# Patient Record
Sex: FEMALE | Race: WHITE | URBAN - METROPOLITAN AREA
[De-identification: names, ages, dates, MRNs, and addresses within clinical notes are randomized per-mention and may not be internally consistent; named-entity substitution may affect disease eponyms.]

---

## 2021-09-09 ENCOUNTER — OFFICE VISIT (OUTPATIENT)
Dept: URBAN - METROPOLITAN AREA CLINIC 51 | Facility: CLINIC | Age: 61
End: 2021-09-09
Payer: COMMERCIAL

## 2021-09-09 DIAGNOSIS — H40.1134 PRIMARY OPEN-ANGLE GLAUCOMA, INDETERMINATE, BILATERAL: Primary | ICD-10-CM

## 2021-09-09 DIAGNOSIS — H43.313 VITREOUS MEMBRANES AND STRANDS, BILATERAL: ICD-10-CM

## 2021-09-09 PROCEDURE — 99204 OFFICE O/P NEW MOD 45 MIN: CPT | Performed by: OPTOMETRIST

## 2021-09-09 PROCEDURE — 92133 CPTRZD OPH DX IMG PST SGM ON: CPT | Performed by: OPTOMETRIST

## 2021-09-09 PROCEDURE — 92134 CPTRZ OPH DX IMG PST SGM RTA: CPT | Performed by: OPTOMETRIST

## 2021-09-09 ASSESSMENT — KERATOMETRY
OD: 45.92
OS: 45.52

## 2021-09-09 ASSESSMENT — INTRAOCULAR PRESSURE
OD: 18
OS: 16

## 2021-09-09 ASSESSMENT — VISUAL ACUITY
OS: 20/20
OD: 20/25

## 2021-09-09 NOTE — IMPRESSION/PLAN
Impression: Vitreous membranes and strands, bilateral: H43.313. Plan: Retina is attached 360 degrees with no signs of any retinal holes, tears, or detachments observed on today's dilated examination. Pt to RTC ASAP if increase in floaters, flashes, or curtain or veil taking away vision.

## 2021-09-09 NOTE — IMPRESSION/PLAN
Impression: Tear film insufficiency of bilateral lacrimal glands: H04.123. *MGD OU Plan: Recommend continue OTC AT's at least 2-3 times a day or more OU. Can use more frequently if symptomatic. Secondary Intention Text (Leave Blank If You Do Not Want): The defect will heal with secondary intention.

## 2021-09-09 NOTE — IMPRESSION/PLAN
Impression: Combined forms of age-related cataract, bilateral: H25.813. Plan: Discussed cataracts with patient. Discussed treatment options. Surgical treatment is recommended. Surgical risks and benefits discussed. Patient elects surgical treatment. Recommend surgery OU, OD first. 
Patient is candidate/interested in multifocal, toric, standard, LenSx and ORA. Aim OD: plano  Aim OS: plano Patient understands that she will need glasses for reading and all near work following surgery and may need full time glasses for best possible vision after cataract surgery. Will bring back prior to cataract surgery for additional glaucoma testing (24-2 HVF, pachymetry, IOP check) with Dr. Donte Cowart.

## 2021-09-09 NOTE — IMPRESSION/PLAN
Impression: Primary open-angle glaucoma, indeterminate, bilateral: H40.2939. *Denies family hx Plan: Due to asymmetric [place] 76 appearance OS>OD
IOPs today: 18/16 RNFL OCT (09/09/2021) OD: IT abn, ST bdl ; OS: ST/IT abn Reviewed today's findings with patient. RNFL OCT taken today that suggests thinning OU. Will bring back prior to cataract surgery for 24-2 HVF + pachymetry + IOP check to determine glaucoma state.

## 2021-10-11 ENCOUNTER — OFFICE VISIT (OUTPATIENT)
Dept: URBAN - METROPOLITAN AREA CLINIC 44 | Facility: CLINIC | Age: 61
End: 2021-10-11
Payer: COMMERCIAL

## 2021-10-11 DIAGNOSIS — Z01.818 ENCOUNTER FOR OTHER PREPROCEDURAL EXAMINATION: Primary | ICD-10-CM

## 2021-10-11 DIAGNOSIS — H25.813 COMBINED FORMS OF AGE-RELATED CATARACT, BILATERAL: ICD-10-CM

## 2021-10-11 PROCEDURE — 99203 OFFICE O/P NEW LOW 30 MIN: CPT | Performed by: PHYSICIAN ASSISTANT

## 2021-10-11 PROCEDURE — 92025 CPTRIZED CORNEAL TOPOGRAPHY: CPT | Performed by: OPHTHALMOLOGY

## 2021-10-11 ASSESSMENT — PACHYMETRY
OS: 22.77
OD: 22.62
OS: 2.91
OD: 3.03

## 2021-10-13 ENCOUNTER — OFFICE VISIT (OUTPATIENT)
Dept: URBAN - METROPOLITAN AREA CLINIC 51 | Facility: CLINIC | Age: 61
End: 2021-10-13
Payer: COMMERCIAL

## 2021-10-13 DIAGNOSIS — H40.1132 PRIMARY OPEN-ANGLE GLAUCOMA, MODERATE STAGE, BILATERAL: ICD-10-CM

## 2021-10-13 DIAGNOSIS — H04.123 TEAR FILM INSUFFICIENCY OF BILATERAL LACRIMAL GLANDS: Primary | ICD-10-CM

## 2021-10-13 PROCEDURE — 99214 OFFICE O/P EST MOD 30 MIN: CPT | Performed by: OPTOMETRIST

## 2021-10-13 PROCEDURE — 92083 EXTENDED VISUAL FIELD XM: CPT | Performed by: OPTOMETRIST

## 2021-10-13 PROCEDURE — 76514 ECHO EXAM OF EYE THICKNESS: CPT | Performed by: OPTOMETRIST

## 2021-10-13 RX ORDER — LATANOPROST 50 UG/ML
0.005 % SOLUTION OPHTHALMIC
Qty: 2.5 | Refills: 0 | Status: INACTIVE
Start: 2021-10-13 | End: 2021-11-04

## 2021-10-13 ASSESSMENT — INTRAOCULAR PRESSURE
OD: 30
OS: 24

## 2021-10-13 NOTE — IMPRESSION/PLAN
Impression: Primary open-angle glaucoma, moderate stage, bilateral: H40.1132. *Denies family hx *RNFL OCT (09/09/2021) OD: IT abn, ST bdl ; OS: ST/IT abn Plan: IOPs today: 30/24 without medications Pachymetry: 474/477 (thin OU) HVF 24-2 (10/13/2021) OD: sup arcuate ;  OS:sup/nasal step Reviewed todays findings. IOPs elevated today and HVF shows visual field loss due to glaucoma. Discussed the need to start medication to lower IOPs and to prevent further vision loss due to glaucoma. START Latanoprost QHS OU (ERx'd to pharmacy) RTC as scheduled with Dr. Gavin Brewer [facility]

## 2021-10-13 NOTE — IMPRESSION/PLAN
Impression: Tear film insufficiency of bilateral lacrimal glands: H04.123. Plan: Recommend start using OTC AT's at least 2-3 times per day or more OU. Can use more often if symptomatic.

## 2021-10-15 ENCOUNTER — OFFICE VISIT (OUTPATIENT)
Dept: URBAN - METROPOLITAN AREA CLINIC 51 | Facility: CLINIC | Age: 61
End: 2021-10-15
Payer: COMMERCIAL

## 2021-10-15 DIAGNOSIS — H40.1121 PRIMARY OPEN-ANGLE GLAUCOMA, LEFT EYE, MILD STAGE: ICD-10-CM

## 2021-10-15 DIAGNOSIS — H40.1113 PRIMARY OPEN-ANGLE GLAUCOMA, RIGHT EYE, SEVERE STAGE: ICD-10-CM

## 2021-10-15 PROCEDURE — 92020 GONIOSCOPY: CPT | Performed by: OPHTHALMOLOGY

## 2021-10-15 PROCEDURE — 76514 ECHO EXAM OF EYE THICKNESS: CPT | Performed by: OPHTHALMOLOGY

## 2021-10-15 PROCEDURE — 92133 CPTRZD OPH DX IMG PST SGM ON: CPT | Performed by: OPHTHALMOLOGY

## 2021-10-15 PROCEDURE — 99204 OFFICE O/P NEW MOD 45 MIN: CPT | Performed by: OPHTHALMOLOGY

## 2021-10-15 RX ORDER — KETOROLAC TROMETHAMINE 5 MG/ML
0.5 % SOLUTION OPHTHALMIC
Qty: 10 | Refills: 1 | Status: INACTIVE
Start: 2021-10-15 | End: 2021-11-16

## 2021-10-15 RX ORDER — OFLOXACIN 3 MG/ML
0.3 % SOLUTION/ DROPS OPHTHALMIC
Qty: 5 | Refills: 1 | Status: INACTIVE
Start: 2021-10-15 | End: 2021-11-16

## 2021-10-15 RX ORDER — PREDNISOLONE ACETATE 10 MG/ML
1 % SUSPENSION/ DROPS OPHTHALMIC
Qty: 10 | Refills: 1 | Status: INACTIVE
Start: 2021-10-15 | End: 2021-11-09

## 2021-10-15 ASSESSMENT — VISUAL ACUITY
OD: 20/25
OS: 20/20

## 2021-10-15 ASSESSMENT — INTRAOCULAR PRESSURE
OS: 17
OD: 17

## 2021-10-15 ASSESSMENT — PACHYMETRY
OD: 3.03
OS: 2.91
OS: 22.77
OD: 22.62

## 2021-10-15 NOTE — IMPRESSION/PLAN
Impression: Combined forms of age-related cataract, bilateral: H25.813. Plan: Discussed cataract diagnosis in detail with patient. Advised patient of treatment options as well as lens options. Discussed risks, benefits, and expectations of cataract surgery. Patient also understands glasses may be necessary for sharpest vision after surgery. Patient understands and accepts that glasses will be necessary following cataract surgery for near and possibly distance. New glasses Rx was not given today. Cataract surgery was offered in both eye(s). Patient wishes to proceed with Cataract surgery. Patient to see [facility] to schedule CE/Phaco/IOL/KDB OD 1st eye [[Aim: -0.25 OU. Std lens, No LRI; patient declines ORA. No Trimoxi/Dexycu, Use Oflox/Pred/Ket QID OD]] Okay to proceed with the second eye once the first eye is cleared.

## 2021-10-15 NOTE — IMPRESSION/PLAN
Impression: Primary open-angle glaucoma, right eye, severe stage: H40.1113. Plan: Discussed cataract and glaucoma diagnosis in detail with patient. Phaco/IOL, [place] Dual Blade Goniotomy surgery was discussed in detail. Discussed risks, benefits, and expectations of cataract surgery. Patient also understands glasses will be necessary for sharpest vision after surgery. Although surgery is expected to improve the condition, the patient understands that there is a chance that the condition could worsen in the future. Patient advised that ongoing uncontrolled glaucoma may result in permanent vision loss. Discussed surgery in detail with patient. Post-Operative instructions reviewed with patient. Discussed activity restrictions including no bending head below waist, rubbing the eye, straining or lifting over 10 lbs, stay out of rufus, dirty areas and shield at night for one week. All questions answered.

## 2021-10-25 ENCOUNTER — SURGERY (OUTPATIENT)
Dept: URBAN - METROPOLITAN AREA SURGERY 19 | Facility: SURGERY | Age: 61
End: 2021-10-25
Payer: COMMERCIAL

## 2021-10-26 ENCOUNTER — POST-OPERATIVE VISIT (OUTPATIENT)
Dept: URBAN - METROPOLITAN AREA CLINIC 51 | Facility: CLINIC | Age: 61
End: 2021-10-26
Payer: COMMERCIAL

## 2021-10-26 PROCEDURE — 99024 POSTOP FOLLOW-UP VISIT: CPT | Performed by: OPTOMETRIST

## 2021-10-26 ASSESSMENT — INTRAOCULAR PRESSURE: OD: 23

## 2021-10-26 NOTE — IMPRESSION/PLAN
Impression: S/P Cataract Extraction by phacoemulsification with IOL placement; Goniotomy OD - 1 Day. Encounter for surgical aftercare following surgery on a sense organ  Z48.810. Plan: POD1: Patient doing well. Minimal swelling noted. Vision is doing excellent 1 day PO. Recommend ATs as needed OU. Reviewed post op restrictions apply for 1 week. Drop instructions: Pred 4-3-2-1 taper (each week decrease). Ofloxacin qid x 1 week. Diclofenac qid x 4 weeks. RTC as scheduled for 1 week post op appointment.

## 2021-11-02 ENCOUNTER — POST-OPERATIVE VISIT (OUTPATIENT)
Dept: URBAN - METROPOLITAN AREA CLINIC 44 | Facility: CLINIC | Age: 61
End: 2021-11-02
Payer: COMMERCIAL

## 2021-11-02 DIAGNOSIS — Z48.810 ENCOUNTER FOR SURGICAL AFTERCARE FOLLOWING SURGERY ON A SENSE ORGAN: Primary | ICD-10-CM

## 2021-11-02 PROCEDURE — 99024 POSTOP FOLLOW-UP VISIT: CPT | Performed by: OPHTHALMOLOGY

## 2021-11-02 ASSESSMENT — INTRAOCULAR PRESSURE
OS: 17
OD: 20

## 2021-11-02 ASSESSMENT — VISUAL ACUITY: OD: 20/20

## 2021-11-02 NOTE — IMPRESSION/PLAN
Impression:  Encounter for surgical aftercare following surgery on a sense organ  Z48.810. Plan: POW1 Phaco/IOL OD. Discussed diagnosis and treatment options in detail. Patient may proceed with second eye cataract surgery based on complaint today. Surgical risks and complications were reviewed today and all questions were answered. Patient reminded of need for glasses for best vision after cataract surgery. Recommend patient continue not rubbing surgical eye for 1 month, no swimming or eye makeup for a total of two weeks. Patient wishes to proceed with surgery of the second eye.
-Drop schedule: Stop Ofloxacin. Continue Ketorolac QID OD [id number] from surgery. Begin taper of Prednisolone OD to TID x1week then BID x1week then QD x1week then stop. Okay to proceed with the second eye- first eye is cleared.

## 2021-11-08 ENCOUNTER — SURGERY (OUTPATIENT)
Dept: URBAN - METROPOLITAN AREA SURGERY 19 | Facility: SURGERY | Age: 61
End: 2021-11-08
Payer: COMMERCIAL

## 2021-11-08 DIAGNOSIS — H25.812 COMBINED FORMS OF AGE-RELATED CATARACT, LEFT EYE: Primary | ICD-10-CM

## 2021-11-09 ENCOUNTER — POST-OPERATIVE VISIT (OUTPATIENT)
Dept: URBAN - METROPOLITAN AREA CLINIC 51 | Facility: CLINIC | Age: 61
End: 2021-11-09
Payer: COMMERCIAL

## 2021-11-09 PROCEDURE — 99024 POSTOP FOLLOW-UP VISIT: CPT | Performed by: OPTOMETRIST

## 2021-11-09 RX ORDER — PREDNISOLONE ACETATE 10 MG/ML
1 % SUSPENSION/ DROPS OPHTHALMIC
Qty: 10 | Refills: 1 | Status: INACTIVE
Start: 2021-11-09 | End: 2021-11-16

## 2021-11-09 ASSESSMENT — INTRAOCULAR PRESSURE: OS: 13

## 2021-11-09 NOTE — IMPRESSION/PLAN
Impression: S/P Cataract Extraction by phacoemulsification with IOL placement; Goniotomy OS - 1 Day. Presence of intraocular lens  Z96.1. Plan: POD1: patient doing well. Mild swelling/inflammation noted. ATs as needed OU. Pred 4-3-2-1 taper (each week decrease). Ofloxacin qid x 1 week. Diclofenac qid x 4 weeks. Continue Latanoprost QHS OU. RTC as scheduled.

## 2021-11-16 ENCOUNTER — POST-OPERATIVE VISIT (OUTPATIENT)
Dept: URBAN - METROPOLITAN AREA CLINIC 44 | Facility: CLINIC | Age: 61
End: 2021-11-16
Payer: COMMERCIAL

## 2021-11-16 PROCEDURE — 99024 POSTOP FOLLOW-UP VISIT: CPT | Performed by: OPHTHALMOLOGY

## 2021-11-16 RX ORDER — PREDNISOLONE ACETATE 10 MG/ML
1 % SUSPENSION/ DROPS OPHTHALMIC
Qty: 10 | Refills: 1 | Status: INACTIVE
Start: 2021-11-16 | End: 2021-12-14

## 2021-11-16 RX ORDER — KETOROLAC TROMETHAMINE 5 MG/ML
0.5 % SOLUTION OPHTHALMIC
Qty: 10 | Refills: 1 | Status: INACTIVE
Start: 2021-11-16 | End: 2021-12-14

## 2021-11-16 ASSESSMENT — INTRAOCULAR PRESSURE
OD: 20
OS: 20

## 2021-11-16 NOTE — IMPRESSION/PLAN
Impression: S/P Cataract Extraction by phacoemulsification with IOL placement; Goniotomy OS - 8 Days. Presence of intraocular lens  Z96.1. Plan: Advised patient that eye is healing well. Discontinue activity restrictions and shield use. Patient to continue not rubbing surgical eye for 1 month. Drop instructions: Stop Ofloxacin. Continue Ketorolac QID OS [id number] from surgery. Begin taper of Prednisolone OS to TID x1week then BID x1week then QD x1week then stop, CONTINUE Latanoprost QHS OU.   
RTC 3 weeks for Refraction/Dilation with Dr. Flor Cos - then April for VF 24-2 JORDEN STD and IOP check - patient to follow in general clinic
*Dr. Lloyd Bradford if laser/surgery is needed*

## 2021-12-14 ENCOUNTER — POST-OPERATIVE VISIT (OUTPATIENT)
Dept: URBAN - METROPOLITAN AREA CLINIC 51 | Facility: CLINIC | Age: 61
End: 2021-12-14
Payer: COMMERCIAL

## 2021-12-14 DIAGNOSIS — H52.4 PRESBYOPIA: Primary | ICD-10-CM

## 2021-12-14 DIAGNOSIS — Z96.1 PRESENCE OF INTRAOCULAR LENS: ICD-10-CM

## 2021-12-14 PROCEDURE — 99024 POSTOP FOLLOW-UP VISIT: CPT | Performed by: OPTOMETRIST

## 2021-12-14 RX ORDER — LATANOPROST 50 UG/ML
0.005 % SOLUTION OPHTHALMIC
Qty: 7.5 | Refills: 2 | Status: ACTIVE
Start: 2021-12-14

## 2021-12-14 ASSESSMENT — INTRAOCULAR PRESSURE
OS: 20
OD: 19

## 2021-12-14 ASSESSMENT — VISUAL ACUITY
OD: 20/20
OS: 20/20

## 2021-12-14 NOTE — IMPRESSION/PLAN
Impression: S/P Cataract Extraction by phacoemulsification with IOL placement; Goniotomy OS - 36 Days. Presence of intraocular lens  Z96.1. Plan: Doing well s/p surgery. Released SRx. Off Latanoprost for about a month (last appt). Refilled drops.   F/u ~ 4 mos CE, RNFL/GCC, HVF

## 2022-05-05 ENCOUNTER — OFFICE VISIT (OUTPATIENT)
Dept: URBAN - METROPOLITAN AREA CLINIC 51 | Facility: CLINIC | Age: 62
End: 2022-05-05
Payer: COMMERCIAL

## 2022-05-05 DIAGNOSIS — Z96.1 PRESENCE OF INTRAOCULAR LENS: ICD-10-CM

## 2022-05-05 DIAGNOSIS — H40.1132 PRIMARY OPEN-ANGLE GLAUCOMA, MODERATE STAGE, BILATERAL: Primary | ICD-10-CM

## 2022-05-05 DIAGNOSIS — H52.4 PRESBYOPIA: ICD-10-CM

## 2022-05-05 DIAGNOSIS — H43.313 VITREOUS MEMBRANES AND STRANDS, BILATERAL: ICD-10-CM

## 2022-05-05 PROCEDURE — 92083 EXTENDED VISUAL FIELD XM: CPT | Performed by: OPTOMETRIST

## 2022-05-05 PROCEDURE — 92133 CPTRZD OPH DX IMG PST SGM ON: CPT | Performed by: OPTOMETRIST

## 2022-05-05 PROCEDURE — 92014 COMPRE OPH EXAM EST PT 1/>: CPT | Performed by: OPTOMETRIST

## 2022-05-05 ASSESSMENT — INTRAOCULAR PRESSURE
OS: 16
OD: 16

## 2022-05-05 ASSESSMENT — VISUAL ACUITY
OD: 20/20
OS: 20/20

## 2022-05-05 ASSESSMENT — KERATOMETRY
OS: 45.58
OD: 46.02

## 2022-05-05 NOTE — IMPRESSION/PLAN
Impression: Presence of intraocular lens: Z96.1. Plan: s/p PCIOL/KDB. Clear/well positioned. Monitor.

## 2022-05-05 NOTE — IMPRESSION/PLAN
Impression: Vitreous membranes and strands, bilateral: H43.313. Plan: New PVD noted OS. Educated patient on process of vitreous syneresis. Retina is attached 360 degrees with no signs of any retinal holes, tears, or detachments observed on today's dilated examination. Patient to RTC ASAP if increase in floaters, flashes, or curtain or veil taking away vision.

## 2022-05-05 NOTE — IMPRESSION/PLAN
Impression: Primary open-angle glaucoma, moderate stage, bilateral: H40.1132. Plan: IOPs today: 16/16 on Latanoprost
RNFL OCT (05/05/2022) OD: abn inf/temp   ;    OS: abn IT/ST
GCA OCT (05/05/2022) OD: abn inf/temp    ;     OS: abn inf HVF 24-2c (05/05/2022) OD: sup/nasal step just crossing midline; inferior points OS: scattered pts Reviewed today's findings. IOP and diagnostic testing remain stable on current regimen. Will continue to monitor to prevent further glaucomatous changes. 
Continue Latanoprost QHS OU

RTC in 6 months for repeat 24-2c HVF + IOP check

## 2022-11-15 ENCOUNTER — OFFICE VISIT (OUTPATIENT)
Dept: URBAN - METROPOLITAN AREA CLINIC 43 | Facility: CLINIC | Age: 62
End: 2022-11-15
Payer: COMMERCIAL

## 2022-11-15 DIAGNOSIS — H04.123 TEAR FILM INSUFFICIENCY OF BILATERAL LACRIMAL GLANDS: ICD-10-CM

## 2022-11-15 DIAGNOSIS — H40.1132 PRIMARY OPEN-ANGLE GLAUCOMA, MODERATE STAGE, BILATERAL: Primary | ICD-10-CM

## 2022-11-15 PROCEDURE — 99213 OFFICE O/P EST LOW 20 MIN: CPT | Performed by: OPTOMETRIST

## 2022-11-15 PROCEDURE — 92083 EXTENDED VISUAL FIELD XM: CPT | Performed by: OPTOMETRIST

## 2022-11-15 ASSESSMENT — INTRAOCULAR PRESSURE
OD: 14
OS: 14

## 2022-11-15 NOTE — IMPRESSION/PLAN
Impression: Tear film insufficiency of bilateral lacrimal glands: H04.123. Plan: Discussed trialing OTC AT's (Ex. Systane or Refresh) if eyes ever feel dry or irritated.

## 2022-11-15 NOTE — IMPRESSION/PLAN
Impression: Primary open-angle glaucoma, moderate stage, bilateral: H40.1132. RNFL OCT (05/05/2022) OD: abn inf/temp   ;    OS: abn IT/ST
GCA OCT (05/05/2022) OD: abn inf/temp    ;     OS: abn inf HVF 24-2c (05/05/2022) OD: sup/nasal step just crossing midline; inferior points OS: scattered pts Plan: IOPs today: 14/14 on Latanoprost
HVF 24-2 (11/15/2022) OD: s/n step, stable  ;  OS: full Reviewed today's findings. IOP and HVF remain stable on current regimen. Reviewed today's HVF with patient. Will continue to monitor to prevent further glaucomatous changes. 
Continue Latanoprost QHS OU 

RTC in 6 months for CE + RNFL/GCA OCT

## 2023-05-30 ENCOUNTER — OFFICE VISIT (OUTPATIENT)
Dept: URBAN - METROPOLITAN AREA CLINIC 51 | Facility: CLINIC | Age: 63
End: 2023-05-30
Payer: COMMERCIAL

## 2023-05-30 DIAGNOSIS — Z96.1 PRESENCE OF INTRAOCULAR LENS: ICD-10-CM

## 2023-05-30 DIAGNOSIS — H40.1132 PRIMARY OPEN-ANGLE GLAUCOMA, MODERATE STAGE, BILATERAL: Primary | ICD-10-CM

## 2023-05-30 DIAGNOSIS — H43.313 VITREOUS MEMBRANES AND STRANDS, BILATERAL: ICD-10-CM

## 2023-05-30 PROCEDURE — 92134 CPTRZ OPH DX IMG PST SGM RTA: CPT | Performed by: OPTOMETRIST

## 2023-05-30 PROCEDURE — 92014 COMPRE OPH EXAM EST PT 1/>: CPT | Performed by: OPTOMETRIST

## 2023-05-30 PROCEDURE — 92133 CPTRZD OPH DX IMG PST SGM ON: CPT | Performed by: OPTOMETRIST

## 2023-05-30 RX ORDER — LATANOPROST 50 UG/ML
0.005 % SOLUTION OPHTHALMIC
Qty: 7.5 | Refills: 2 | Status: ACTIVE
Start: 2023-05-30

## 2023-05-30 ASSESSMENT — KERATOMETRY
OD: 45.88
OS: 45.88

## 2023-05-30 ASSESSMENT — INTRAOCULAR PRESSURE
OD: 15
OS: 15

## 2023-05-30 ASSESSMENT — VISUAL ACUITY
OS: 20/20
OD: 20/20

## 2023-05-30 NOTE — IMPRESSION/PLAN
Impression: Primary open-angle glaucoma, moderate stage, bilateral: H40.1132. HVF 24-2 (11/15/2022) OD: s/n step, stable  ;  OS: full Plan: IOPs today: 15/15 on Latanoprost
RNFL OCT (05/30/2023) OD: abn inf/temp ; OS: abn IT/ST
GCA OCT (05/30/2023) OD: abn inf/temp ; OS: abn inf Reviewed today's findings. IOP and RNFL/GCA OCT stable on current regimen. Will continue to monitor to prevent further glaucomatous changes. Continue Latanoprost QHS OU (ERx'd refills) RTC in 6 months for 24-2c HVF + IOP check

## 2024-01-26 ENCOUNTER — OFFICE VISIT (OUTPATIENT)
Dept: URBAN - METROPOLITAN AREA CLINIC 51 | Facility: CLINIC | Age: 64
End: 2024-01-26
Payer: COMMERCIAL

## 2024-01-26 DIAGNOSIS — H40.1132 PRIMARY OPEN-ANGLE GLAUCOMA, BILATERAL, MODERATE STAGE: Primary | ICD-10-CM

## 2024-01-26 PROCEDURE — 92083 EXTENDED VISUAL FIELD XM: CPT | Performed by: OPTOMETRIST

## 2024-01-26 PROCEDURE — 99213 OFFICE O/P EST LOW 20 MIN: CPT | Performed by: OPTOMETRIST

## 2024-01-26 RX ORDER — LATANOPROST 50 UG/ML
0.005 % SOLUTION OPHTHALMIC
Qty: 7.5 | Refills: 2 | Status: ACTIVE
Start: 2024-01-26

## 2024-01-26 ASSESSMENT — INTRAOCULAR PRESSURE
OS: 13
OD: 14

## 2024-08-08 ENCOUNTER — OFFICE VISIT (OUTPATIENT)
Dept: URBAN - METROPOLITAN AREA CLINIC 51 | Facility: CLINIC | Age: 64
End: 2024-08-08
Payer: COMMERCIAL

## 2024-08-08 DIAGNOSIS — H40.1132 PRIMARY OPEN-ANGLE GLAUCOMA, MODERATE STAGE, BILATERAL: Primary | ICD-10-CM

## 2024-08-08 DIAGNOSIS — H26.493 OTHER SECONDARY CATARACT, BILATERAL: ICD-10-CM

## 2024-08-08 DIAGNOSIS — H04.123 TEAR FILM INSUFFICIENCY OF BILATERAL LACRIMAL GLANDS: ICD-10-CM

## 2024-08-08 DIAGNOSIS — H43.313 VITREOUS MEMBRANES AND STRANDS, BILATERAL: ICD-10-CM

## 2024-08-08 PROCEDURE — 92014 COMPRE OPH EXAM EST PT 1/>: CPT | Performed by: OPTOMETRIST

## 2024-08-08 PROCEDURE — 92134 CPTRZ OPH DX IMG PST SGM RTA: CPT | Performed by: OPTOMETRIST

## 2024-08-08 PROCEDURE — 92133 CPTRZD OPH DX IMG PST SGM ON: CPT | Performed by: OPTOMETRIST

## 2024-08-08 ASSESSMENT — VISUAL ACUITY
OS: 20/20
OD: 20/20

## 2024-08-08 ASSESSMENT — INTRAOCULAR PRESSURE
OD: 15
OS: 15

## 2024-08-08 ASSESSMENT — KERATOMETRY
OS: 45.63
OD: 46.00

## 2024-10-30 ENCOUNTER — OFFICE VISIT (OUTPATIENT)
Dept: URBAN - METROPOLITAN AREA CLINIC 51 | Facility: CLINIC | Age: 64
End: 2024-10-30
Payer: COMMERCIAL

## 2024-10-30 DIAGNOSIS — H40.1132 PRIMARY OPEN-ANGLE GLAUCOMA, BILATERAL, MODERATE STAGE: Primary | ICD-10-CM

## 2024-10-30 PROCEDURE — 92083 EXTENDED VISUAL FIELD XM: CPT | Performed by: OPTOMETRIST

## 2024-10-30 PROCEDURE — 99213 OFFICE O/P EST LOW 20 MIN: CPT | Performed by: OPTOMETRIST

## 2024-10-30 RX ORDER — LATANOPROST 50 UG/ML
0.005 % SOLUTION OPHTHALMIC
Qty: 7.5 | Refills: 2 | Status: ACTIVE
Start: 2024-10-30

## 2024-10-30 ASSESSMENT — INTRAOCULAR PRESSURE
OD: 19
OS: 16

## 2025-02-28 ENCOUNTER — OFFICE VISIT (OUTPATIENT)
Dept: URBAN - METROPOLITAN AREA CLINIC 51 | Facility: CLINIC | Age: 65
End: 2025-02-28
Payer: COMMERCIAL

## 2025-02-28 DIAGNOSIS — H40.1132 PRIMARY OPEN-ANGLE GLAUCOMA, MODERATE STAGE, BILATERAL: Primary | ICD-10-CM

## 2025-02-28 PROCEDURE — 99213 OFFICE O/P EST LOW 20 MIN: CPT | Performed by: OPTOMETRIST

## 2025-02-28 ASSESSMENT — INTRAOCULAR PRESSURE
OS: 16
OD: 16

## 2025-08-28 ENCOUNTER — OFFICE VISIT (OUTPATIENT)
Dept: URBAN - METROPOLITAN AREA CLINIC 51 | Facility: CLINIC | Age: 65
End: 2025-08-28
Payer: COMMERCIAL

## 2025-08-28 DIAGNOSIS — H43.313 VITREOUS MEMBRANES AND STRANDS, BILATERAL: Primary | ICD-10-CM

## 2025-08-28 DIAGNOSIS — H04.123 TEAR FILM INSUFFICIENCY OF BILATERAL LACRIMAL GLANDS: ICD-10-CM

## 2025-08-28 DIAGNOSIS — H40.1132 PRIMARY OPEN-ANGLE GLAUCOMA, BILATERAL, MODERATE STAGE: ICD-10-CM

## 2025-08-28 PROCEDURE — 92133 CPTRZD OPH DX IMG PST SGM ON: CPT | Performed by: OPTOMETRIST

## 2025-08-28 PROCEDURE — 92134 CPTRZ OPH DX IMG PST SGM RTA: CPT | Performed by: OPTOMETRIST

## 2025-08-28 PROCEDURE — 92014 COMPRE OPH EXAM EST PT 1/>: CPT | Performed by: OPTOMETRIST

## 2025-08-28 ASSESSMENT — INTRAOCULAR PRESSURE
OD: 16
OS: 16

## 2025-08-28 ASSESSMENT — VISUAL ACUITY
OS: 20/20
OD: 20/20

## 2025-08-28 ASSESSMENT — KERATOMETRY
OS: 45.63
OD: 46.13